# Patient Record
Sex: MALE | Race: WHITE
[De-identification: names, ages, dates, MRNs, and addresses within clinical notes are randomized per-mention and may not be internally consistent; named-entity substitution may affect disease eponyms.]

---

## 2021-06-30 ENCOUNTER — HOSPITAL ENCOUNTER (EMERGENCY)
Dept: HOSPITAL 54 - ER | Age: 46
Discharge: HOME | End: 2021-06-30
Payer: SELF-PAY

## 2021-06-30 VITALS — HEIGHT: 66 IN | BODY MASS INDEX: 30.53 KG/M2 | WEIGHT: 190 LBS

## 2021-06-30 VITALS — SYSTOLIC BLOOD PRESSURE: 158 MMHG | DIASTOLIC BLOOD PRESSURE: 98 MMHG
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
## 2021-06-30 DIAGNOSIS — M54.2: ICD-10-CM
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
## 2021-06-30 DIAGNOSIS — F17.200: ICD-10-CM

## 2021-06-30 DIAGNOSIS — M25.522: Primary | ICD-10-CM
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
## 2021-06-30 NOTE — NUR
BIB  IN A SITTIBNG POSITION,LEFT KNEE AND ELBOW PAIN (RATES PAINS 6/10). 
S/P MINOR TC RESTRAINED . DENIES NUMBNESS/TINGLING IN THE EXTREMITIES. 
THE PATIENT AND ORIENTED X4. IN ROOM AIR AND DENIES SOB. RESPIRATION REGULAR 
AND UNLABORED. ATTACHED TO THE MONITOR. WARM BLANKET PROVIDED FOR COMFORT. WILL 
CONTINUE TO MONITOR THE PATIENT.

## 2021-06-30 NOTE — NUR
The patient alert and oriented x4. Patient discharged to home in stable 
condition. Written and verbal after care instructions given. Patient verbalizes 
understanding of instruction. Patient has stable gait.